# Patient Record
(demographics unavailable — no encounter records)

---

## 2025-02-04 NOTE — REVIEW OF SYSTEMS
[Fever] : fever [Eye Discharge] : eye discharge [Eye Redness] : eye redness [Nasal Discharge] : nasal discharge [Nasal Congestion] : nasal congestion [Sore Throat] : no sore throat [Cough] : cough [Negative] : Skin

## 2025-02-04 NOTE — PHYSICAL EXAM
[Acute Distress] : no acute distress [Alert] : alert [Conjuctival Injection] : conjunctival injection [Increased Tearing] : increased tearing [Left] : (left) [Discharge] : no discharge [Eyelid Swelling] : no eyelid swelling [Pink Nasal Mucosa] : pink nasal mucosa [Erythematous Oropharynx] : nonerythematous oropharynx [Supple] : supple [Clear to Auscultation Bilaterally] : clear to auscultation bilaterally [Regular Rate and Rhythm] : regular rate and rhythm [Normal S1, S2 audible] : normal S1, S2 audible [Murmur] : no murmur [Moves All Extremities x 4] : moves all extremities x4 [Normotonic] : normotonic [NL] : warm, clear

## 2025-02-04 NOTE — DISCUSSION/SUMMARY
[FreeTextEntry1] : #viral conjunctivitis  #influenza A - Recommend supportive care including antipyretics, fluids, rest, and nasal saline followed by nasal suction. Parent declined tamiflu - advise refresh eye drops to L eye BID x 3-5 days

## 2025-02-04 NOTE — HISTORY OF PRESENT ILLNESS
[de-identified] : Fever and fatigue; Red eye [FreeTextEntry6] : Coral is a 6 yo F who presents with fatigue x 3 days, fever x 2 days (controlled with tylenol), L eye pain and L eye redness. Endorses cough, runny nose during this time. Denies sore throat.  Endorses watery L eye MOM cell: [phone]